# Patient Record
Sex: FEMALE | Race: WHITE | NOT HISPANIC OR LATINO | Employment: OTHER | URBAN - METROPOLITAN AREA
[De-identification: names, ages, dates, MRNs, and addresses within clinical notes are randomized per-mention and may not be internally consistent; named-entity substitution may affect disease eponyms.]

---

## 2020-03-16 RX ORDER — CITALOPRAM 20 MG/1
20 TABLET ORAL DAILY
COMMUNITY
End: 2020-10-21

## 2020-03-16 RX ORDER — POTASSIUM CITRATE 10 MEQ/1
10 TABLET, EXTENDED RELEASE ORAL
COMMUNITY

## 2020-03-16 RX ORDER — LOSARTAN POTASSIUM 100 MG/1
100 TABLET ORAL EVERY MORNING
COMMUNITY

## 2020-03-16 RX ORDER — AMLODIPINE BESYLATE 5 MG/1
5 TABLET ORAL 2 TIMES DAILY
COMMUNITY

## 2020-03-16 RX ORDER — HYDRALAZINE HYDROCHLORIDE 25 MG/1
100 TABLET, FILM COATED ORAL 2 TIMES DAILY
COMMUNITY

## 2020-03-16 RX ORDER — LACOSAMIDE 200 MG/1
200 TABLET ORAL EVERY 12 HOURS SCHEDULED
COMMUNITY

## 2020-03-16 RX ORDER — HYDROCODONE BITARTRATE AND ACETAMINOPHEN 7.5; 325 MG/1; MG/1
1 TABLET ORAL EVERY 6 HOURS PRN
COMMUNITY

## 2020-03-16 RX ORDER — LEVETIRACETAM 500 MG/1
1500 TABLET ORAL 2 TIMES DAILY
COMMUNITY

## 2020-03-16 RX ORDER — ROPINIROLE 0.5 MG/1
0.5 TABLET, FILM COATED ORAL NIGHTLY
COMMUNITY
End: 2020-12-11

## 2020-03-16 RX ORDER — GABAPENTIN 100 MG/1
100 CAPSULE ORAL 2 TIMES DAILY
COMMUNITY

## 2020-03-16 RX ORDER — DIPHENHYDRAMINE HCL 50 MG
50 CAPSULE ORAL EVERY 6 HOURS PRN
COMMUNITY

## 2020-03-16 RX ORDER — CHLORAL HYDRATE 500 MG
1 CAPSULE ORAL DAILY
Status: ON HOLD | COMMUNITY
End: 2020-12-14

## 2020-03-17 ENCOUNTER — OFFICE VISIT (OUTPATIENT)
Dept: NEUROSURGERY | Facility: CLINIC | Age: 65
End: 2020-03-17

## 2020-03-17 ENCOUNTER — PREP FOR SURGERY (OUTPATIENT)
Dept: OTHER | Facility: HOSPITAL | Age: 65
End: 2020-03-17

## 2020-03-17 VITALS — WEIGHT: 160 LBS | HEIGHT: 63 IN | BODY MASS INDEX: 28.35 KG/M2 | TEMPERATURE: 97.8 F

## 2020-03-17 DIAGNOSIS — G40.219 LOCALIZATION-RELATED (FOCAL) (PARTIAL) SYMPTOMATIC EPILEPSY AND EPILEPTIC SYNDROMES WITH COMPLEX PARTIAL SEIZURES, INTRACTABLE, WITHOUT STATUS EPILEPTICUS (HCC): ICD-10-CM

## 2020-03-17 DIAGNOSIS — G40.219 LOCALIZATION-RELATED (FOCAL) (PARTIAL) SYMPTOMATIC EPILEPSY AND EPILEPTIC SYNDROMES WITH COMPLEX PARTIAL SEIZURES, INTRACTABLE, WITHOUT STATUS EPILEPTICUS (HCC): Primary | ICD-10-CM

## 2020-03-17 DIAGNOSIS — Z45.42 BATTERY END OF LIFE OF VAGUS NERVE STIMULATOR: Primary | ICD-10-CM

## 2020-03-17 PROCEDURE — 99204 OFFICE O/P NEW MOD 45 MIN: CPT | Performed by: NEUROLOGICAL SURGERY

## 2020-03-17 RX ORDER — CEFAZOLIN SODIUM 2 G/100ML
2 INJECTION, SOLUTION INTRAVENOUS ONCE
Status: CANCELLED | OUTPATIENT
Start: 2020-03-17 | End: 2020-03-17

## 2020-03-17 RX ORDER — FAMOTIDINE 20 MG/1
20 TABLET, FILM COATED ORAL
Status: CANCELLED | OUTPATIENT
Start: 2020-03-17

## 2020-03-17 NOTE — PROGRESS NOTES
"Patient: Barbi Gomez  : 1955    Primary Care Provider: Gurdeep Yun MD    Requesting Provider: As above        History    Chief Complaint:   1.  Low battery on vagal nerve stimulator.  2.  Seizure disorder.    History of Present Illness: Ms. Gomez is a 64-year-old right-handed disabled woman with a longstanding seizure disorder.  A vagal nerve stimulator was placed at the [facility] in .  She has occasional seizures.  She does sometimes know the seizures are coming on and can swipe her magnet over the unit and abort the seizure.  When she has a seizure she has a feeling as if there is \"a paper sack\" over her head.  She does not lose consciousness.  She is somewhat dazed and confused thereafter.  Seizures are worse with stress.  Apparently the unit is now reading 5%.    Review of Systems   Constitutional: Positive for fatigue. Negative for activity change, appetite change, chills, diaphoresis, fever and unexpected weight change.   HENT: Positive for postnasal drip and sinus pressure. Negative for congestion, dental problem, drooling, ear discharge, ear pain, facial swelling, hearing loss, mouth sores, nosebleeds, rhinorrhea, sneezing, sore throat, tinnitus, trouble swallowing and voice change.    Eyes: Positive for itching. Negative for photophobia, pain, discharge, redness and visual disturbance.   Respiratory: Negative for apnea, cough, choking, chest tightness, shortness of breath, wheezing and stridor.    Cardiovascular: Negative for chest pain, palpitations and leg swelling.   Gastrointestinal: Negative for abdominal distention, abdominal pain, anal bleeding, blood in stool, constipation, diarrhea, nausea, rectal pain and vomiting.   Endocrine: Negative for cold intolerance, heat intolerance, polydipsia, polyphagia and polyuria.   Genitourinary: Negative for decreased urine volume, difficulty urinating, dysuria, enuresis, flank pain, frequency, genital sores, hematuria and urgency. " "  Musculoskeletal: Negative for arthralgias, back pain, gait problem, joint swelling, myalgias, neck pain and neck stiffness.   Skin: Negative for color change, pallor, rash and wound.   Allergic/Immunologic: Negative for environmental allergies, food allergies and immunocompromised state.   Neurological: Positive for dizziness and seizures. Negative for tremors, syncope, facial asymmetry, speech difficulty, weakness, light-headedness, numbness and headaches.   Hematological: Negative for adenopathy. Does not bruise/bleed easily.   Psychiatric/Behavioral: Negative for agitation, behavioral problems, confusion, decreased concentration, dysphoric mood, hallucinations, self-injury, sleep disturbance and suicidal ideas. The patient is not nervous/anxious and is not hyperactive.        The patient's past medical history, past surgical history, family history, and social history have been reviewed at length in the electronic medical record.    Physical Exam:   Temp 97.8 °F (36.6 °C) (Temporal)   Ht 160 cm (63\")   Wt 72.6 kg (160 lb)   BMI 28.34 kg/m²   CONSTITUTIONAL: Patient is well-nourished, pleasant and appears stated age.  CV: Heart regular rate and rhythm without murmur, rub, or gallop.  PULMONARY: Lungs are clear to ascultation.  MUSCULOSKELETAL:  Neck tenderness to palpation is not observed.   ROM in neck is normal.  Well-healed left anterior cervical incision is noted.  The generator is present on the mid chest left of midline.  NEUROLOGICAL:  Orientation, memory, attention span, language function, and cognition have been examined and are intact.  Strength is intact in the upper and lower extremities to direct testing.  Muscle tone is normal throughout.  Station and gait are normal.  Sensation is intact to light touch testing throughout.  Deep tendon reflexes are 1+ and symmetrical.  Kvng's Sign is negative bilaterally. No clonus is elicited.  Coordination is intact.  CRANIAL NERVES:  Cranial Nerve II: " Review of the fundi demonstates no edema.  Visual fields are full to confrontation.  Cranial Nerve III, IV, and VI: PERRLADC. Extraocular movements are intact.  Nystagmus is not present.  Cranial Nerve V: Facial sensation is intact to light touch.  Cranial Nerve VII: Muscles of facial expression demonstate no weakness or asymmetry.  Cranial Nerve VIII: Hearing is intact to finger rub bilaterally.  Cranial Nerve IX and X: Palate elevates symmetrically.  Cranial Nerve XI: Shoulder shrug is intact bilaterally.  Cranial Nerve XII: Tongue is midline without evidence of atrophy or fasciculation.    Medical Decision Making      Diagnosis:   1.  Low battery on vagal nerve stimulator.  2.  Seizure disorder.    Treatment Options:   I have recommended VNS battery change out and revision as necessary.  If her battery runs completely out then she is at high risk for developing further seizures.  The nature of the procedure as well as the potential risks, complications, limitations, and alternatives to the procedure were discussed at length with the patient and the patient has agreed to proceed with surgery.       Diagnosis Plan   1. Battery end of life of vagus nerve stimulator     2. Localization-related (focal) (partial) symptomatic epilepsy and epileptic syndromes with complex partial seizures, intractable, without status epilepticus (CMS/HCC)         Scribed for Chilango Donnelly MD by Corina Donovan CMA on 03/17/2020 at 2:43 PM      I, Dr. Donnelly, personally performed the services described in the documentation, as scribed in my presence, and it is both accurate and complete.

## 2020-03-17 NOTE — H&P
"Patient: Barbi Gomez  : 1955     Primary Care Provider: Gurdeep Yun MD     Requesting Provider: As above           History     Chief Complaint:   1.  Low battery on vagal nerve stimulator.  2.  Seizure disorder.     History of Present Illness: Ms. Gomez is a 64-year-old right-handed disabled woman with a longstanding seizure disorder.  A vagal nerve stimulator was placed at the [facility] in .  She has occasional seizures.  She does sometimes know the seizures are coming on and can swipe her magnet over the unit and abort the seizure.  When she has a seizure she has a feeling as if there is \"a paper sack\" over her head.  She does not lose consciousness.  She is somewhat dazed and confused thereafter.  Seizures are worse with stress.  Apparently the unit is now reading 5%.     Review of Systems   Constitutional: Positive for fatigue. Negative for activity change, appetite change, chills, diaphoresis, fever and unexpected weight change.   HENT: Positive for postnasal drip and sinus pressure. Negative for congestion, dental problem, drooling, ear discharge, ear pain, facial swelling, hearing loss, mouth sores, nosebleeds, rhinorrhea, sneezing, sore throat, tinnitus, trouble swallowing and voice change.    Eyes: Positive for itching. Negative for photophobia, pain, discharge, redness and visual disturbance.   Respiratory: Negative for apnea, cough, choking, chest tightness, shortness of breath, wheezing and stridor.    Cardiovascular: Negative for chest pain, palpitations and leg swelling.   Gastrointestinal: Negative for abdominal distention, abdominal pain, anal bleeding, blood in stool, constipation, diarrhea, nausea, rectal pain and vomiting.   Endocrine: Negative for cold intolerance, heat intolerance, polydipsia, polyphagia and polyuria.   Genitourinary: Negative for decreased urine volume, difficulty urinating, dysuria, enuresis, flank pain, frequency, genital sores, hematuria and urgency. "   Musculoskeletal: Negative for arthralgias, back pain, gait problem, joint swelling, myalgias, neck pain and neck stiffness.   Skin: Negative for color change, pallor, rash and wound.   Allergic/Immunologic: Negative for environmental allergies, food allergies and immunocompromised state.   Neurological: Positive for dizziness and seizures. Negative for tremors, syncope, facial asymmetry, speech difficulty, weakness, light-headedness, numbness and headaches.   Hematological: Negative for adenopathy. Does not bruise/bleed easily.   Psychiatric/Behavioral: Negative for agitation, behavioral problems, confusion, decreased concentration, dysphoric mood, hallucinations, self-injury, sleep disturbance and suicidal ideas. The patient is not nervous/anxious and is not hyperactive.          The patient's past medical history, past surgical history, family history, and social history have been reviewed at length in the electronic medical record.     Past Medical History:   Diagnosis Date   • Chronic kidney disease    • Depression    • Hypertension    • Osteoporosis    • Seizure disorder (CMS/HCC)      Past Surgical History:   Procedure Laterality Date   • ANKLE SURGERY     • CHOLECYSTECTOMY     • CYSTOSCOPY W/ URETEROSCOPY W/ LITHOTRIPSY     • VAGUS NERVE STIMULATOR IMPLANTATION  2015    Dr. Gaurang Ellison     Family History   Problem Relation Age of Onset   • Crohn's disease Mother    • COPD Father    • Cancer Sister      Social History     Socioeconomic History   • Marital status:      Spouse name: Not on file   • Number of children: Not on file   • Years of education: Not on file   • Highest education level: Not on file   Tobacco Use   • Smoking status: Never Smoker   • Smokeless tobacco: Never Used   Substance and Sexual Activity   • Alcohol use: Never     Frequency: Never   • Drug use: Never   • Sexual activity: Defer       Allergies   Allergen Reactions   • Bacitracin Rash   • Benzamide Derivatives Rash   •  "Gramicidin Rash   • Neomycin Rash   • Polymyxin B Rash       Current Outpatient Medications on File Prior to Visit   Medication Sig Dispense Refill   • amLODIPine (NORVASC) 5 MG tablet Take 5 mg by mouth Daily.     • calcium carbonate-cholecalciferol 500-400 MG-UNIT tablet tablet Take  by mouth Daily.     • citalopram (CeleXA) 20 MG tablet Take 20 mg by mouth Daily.     • diphenhydrAMINE (BENADRYL) 50 MG capsule Take 50 mg by mouth Every 6 (Six) Hours As Needed for Itching.     • gabapentin (NEURONTIN) 100 MG capsule Take 100 mg by mouth 3 (Three) Times a Day.     • hydrALAZINE (APRESOLINE) 25 MG tablet Take 25 mg by mouth 3 (Three) Times a Day.     • HYDROcodone-acetaminophen (NORCO) 7.5-325 MG per tablet Take 1 tablet by mouth Every 6 (Six) Hours As Needed for Moderate Pain .     • lacosamide (VIMPAT) 200 MG tablet Take 200 mg by mouth Every 12 (Twelve) Hours.     • levETIRAcetam (KEPPRA) 500 MG tablet Take 500 mg by mouth 2 (Two) Times a Day.     • losartan (COZAAR) 100 MG tablet Take 100 mg by mouth Daily.     • Omega-3 1000 MG capsule Take  by mouth.     • potassium citrate (UROCIT-K) 10 MEQ (1080 MG) CR tablet Take 10 mEq by mouth 3 (Three) Times a Day With Meals.     • rOPINIRole (REQUIP) 0.5 MG tablet Take 0.5 mg by mouth Every Night. Take 1 hour before bedtime.     • Sennosides 8.6 MG capsule Take  by mouth.       No current facility-administered medications on file prior to visit.         Physical Exam:   Temp 97.8 °F (36.6 °C) (Temporal)   Ht 160 cm (63\")   Wt 72.6 kg (160 lb)   BMI 28.34 kg/m²   CONSTITUTIONAL: Patient is well-nourished, pleasant and appears stated age.  CV: Heart regular rate and rhythm without murmur, rub, or gallop.  PULMONARY: Lungs are clear to ascultation.  MUSCULOSKELETAL:  Neck tenderness to palpation is not observed.   ROM in neck is normal.  Well-healed left anterior cervical incision is noted.  The generator is present on the mid chest left of " midline.  NEUROLOGICAL:  Orientation, memory, attention span, language function, and cognition have been examined and are intact.  Strength is intact in the upper and lower extremities to direct testing.  Muscle tone is normal throughout.  Station and gait are normal.  Sensation is intact to light touch testing throughout.  Deep tendon reflexes are 1+ and symmetrical.  Kvng's Sign is negative bilaterally. No clonus is elicited.  Coordination is intact.  CRANIAL NERVES:  Cranial Nerve II: Review of the fundi demonstates no edema.  Visual fields are full to confrontation.  Cranial Nerve III, IV, and VI: PERRLADC. Extraocular movements are intact.  Nystagmus is not present.  Cranial Nerve V: Facial sensation is intact to light touch.  Cranial Nerve VII: Muscles of facial expression demonstate no weakness or asymmetry.  Cranial Nerve VIII: Hearing is intact to finger rub bilaterally.  Cranial Nerve IX and X: Palate elevates symmetrically.  Cranial Nerve XI: Shoulder shrug is intact bilaterally.  Cranial Nerve XII: Tongue is midline without evidence of atrophy or fasciculation.     Medical Decision Making        Diagnosis:   1.  Low battery on vagal nerve stimulator.  2.  Seizure disorder.     Treatment Options:   I have recommended VNS battery change out and revision as necessary.  If her battery runs completely out then she is at high risk for developing further seizures.  The nature of the procedure as well as the potential risks, complications, limitations, and alternatives to the procedure were discussed at length with the patient and the patient has agreed to proceed with surgery.          Diagnosis Plan   1. Battery end of life of vagus nerve stimulator      2. Localization-related (focal) (partial) symptomatic epilepsy and epileptic syndromes with complex partial seizures, intractable, without status epilepticus (CMS/HCC)

## 2020-10-21 ENCOUNTER — OFFICE VISIT (OUTPATIENT)
Dept: NEUROSURGERY | Facility: CLINIC | Age: 65
End: 2020-10-21

## 2020-10-21 ENCOUNTER — PREP FOR SURGERY (OUTPATIENT)
Dept: OTHER | Facility: HOSPITAL | Age: 65
End: 2020-10-21

## 2020-10-21 VITALS
DIASTOLIC BLOOD PRESSURE: 70 MMHG | HEIGHT: 63 IN | BODY MASS INDEX: 28.53 KG/M2 | WEIGHT: 161 LBS | SYSTOLIC BLOOD PRESSURE: 124 MMHG | TEMPERATURE: 97.5 F

## 2020-10-21 DIAGNOSIS — Z45.42 BATTERY END OF LIFE OF VAGUS NERVE STIMULATOR: Primary | ICD-10-CM

## 2020-10-21 DIAGNOSIS — G40.219 LOCALIZATION-RELATED (FOCAL) (PARTIAL) SYMPTOMATIC EPILEPSY AND EPILEPTIC SYNDROMES WITH COMPLEX PARTIAL SEIZURES, INTRACTABLE, WITHOUT STATUS EPILEPTICUS (HCC): ICD-10-CM

## 2020-10-21 PROCEDURE — 99214 OFFICE O/P EST MOD 30 MIN: CPT | Performed by: PHYSICIAN ASSISTANT

## 2020-10-21 RX ORDER — CEFAZOLIN SODIUM 2 G/100ML
2 INJECTION, SOLUTION INTRAVENOUS ONCE
Status: CANCELLED | OUTPATIENT
Start: 2020-10-21 | End: 2020-10-21

## 2020-10-21 RX ORDER — ICOSAPENT ETHYL 1000 MG/1
CAPSULE ORAL 2 TIMES DAILY
COMMUNITY
Start: 2020-09-23 | End: 2020-12-11

## 2020-10-21 RX ORDER — FAMOTIDINE 20 MG/1
20 TABLET, FILM COATED ORAL
Status: CANCELLED | OUTPATIENT
Start: 2020-10-21

## 2020-10-21 NOTE — PROGRESS NOTES
"Patient: Barbi Gomez  : 1955  Chart #: [id number]    Date of Service: 10/21/2020    CHIEF COMPLAINT:   1.  Low battery on vagal nerve stimulator.  2.  Seizure disorder    History of Present Illness Ms. Gomez is a 65-year-old woman who was seen by Dr. Donnelly earlier this year and recommended to have a VNS battery change out.  Her  has been very sick so she has been unable to follow through with surgery.  She is now ready.  She is a right-handed disabled woman with a longstanding seizure disorder.  In , she had a vagal nerve stimulator placed at the [facility] and has done well.  She has occasional seizures.  As of late, she feels like when she swipes her magnet over the unit nothing happens.  Her seizures are characterized as a feeling of a \"paper sack\" over her head.  She does not lose consciousness.  When seen in March of this year, her unit was reading 5%.  She has not been back to see her neurologist, Dr. Car, as she has relocated. She is in the process of establishing care with Dr Dean.       Past Medical History:   Diagnosis Date   • Chronic kidney disease    • Depression    • Hypertension    • Osteoporosis    • Seizure disorder (CMS/HCC)          Current Outpatient Medications:   •  amLODIPine (NORVASC) 5 MG tablet, Take 5 mg by mouth Daily., Disp: , Rfl:   •  calcium carbonate-cholecalciferol 500-400 MG-UNIT tablet tablet, Take  by mouth Daily., Disp: , Rfl:   •  diclofenac (VOLTAREN) 1 % gel gel, Apply  topically to the appropriate area as directed As Needed., Disp: , Rfl:   •  diphenhydrAMINE (BENADRYL) 50 MG capsule, Take 50 mg by mouth Every 6 (Six) Hours As Needed for Itching., Disp: , Rfl:   •  gabapentin (NEURONTIN) 100 MG capsule, Take 100 mg by mouth 3 (Three) Times a Day., Disp: , Rfl:   •  hydrALAZINE (APRESOLINE) 25 MG tablet, Take 25 mg by mouth 3 (Three) Times a Day., Disp: , Rfl:   •  HYDROcodone-acetaminophen (NORCO) 7.5-325 MG per tablet, Take 1 tablet by mouth Every 6 " (Six) Hours As Needed for Moderate Pain ., Disp: , Rfl:   •  lacosamide (VIMPAT) 200 MG tablet, Take 200 mg by mouth Every 12 (Twelve) Hours., Disp: , Rfl:   •  levETIRAcetam (KEPPRA) 500 MG tablet, Take 500 mg by mouth 2 (Two) Times a Day., Disp: , Rfl:   •  losartan (COZAAR) 100 MG tablet, Take 100 mg by mouth Daily., Disp: , Rfl:   •  Omega-3 1000 MG capsule, Take  by mouth., Disp: , Rfl:   •  potassium citrate (UROCIT-K) 10 MEQ (1080 MG) CR tablet, Take 10 mEq by mouth 3 (Three) Times a Day With Meals., Disp: , Rfl:   •  rOPINIRole (REQUIP) 0.5 MG tablet, Take 0.5 mg by mouth Every Night. Take 1 hour before bedtime., Disp: , Rfl:   •  Vascepa 1 g capsule capsule, 2 (two) times a day., Disp: , Rfl:     Past Surgical History:   Procedure Laterality Date   • ANKLE SURGERY     • CHOLECYSTECTOMY     • CYSTOSCOPY W/ URETEROSCOPY W/ LITHOTRIPSY     • VAGUS NERVE STIMULATOR IMPLANTATION  2015    Dr. Gaurang Ellison       Social History     Socioeconomic History   • Marital status:      Spouse name: Not on file   • Number of children: Not on file   • Years of education: Not on file   • Highest education level: Not on file   Tobacco Use   • Smoking status: Never Smoker   • Smokeless tobacco: Never Used   Substance and Sexual Activity   • Alcohol use: Never     Frequency: Never   • Drug use: Never   • Sexual activity: Defer         Review of Systems   Constitutional: Positive for fatigue. Negative for activity change, appetite change, chills, diaphoresis, fever and unexpected weight change.   HENT: Positive for postnasal drip and sinus pressure. Negative for congestion, dental problem, drooling, ear discharge, ear pain, facial swelling, hearing loss, mouth sores, nosebleeds, rhinorrhea, sneezing, sore throat, tinnitus, trouble swallowing and voice change.    Eyes: Positive for itching. Negative for photophobia, pain, discharge, redness and visual disturbance.   Respiratory: Negative for apnea, cough, choking, chest  "tightness, shortness of breath, wheezing and stridor.    Cardiovascular: Negative for chest pain, palpitations and leg swelling.   Gastrointestinal: Negative for abdominal distention, abdominal pain, anal bleeding, blood in stool, constipation, diarrhea, nausea, rectal pain and vomiting.   Endocrine: Negative for cold intolerance, heat intolerance, polydipsia, polyphagia and polyuria.   Genitourinary: Negative for decreased urine volume, difficulty urinating, dysuria, enuresis, flank pain, frequency, genital sores, hematuria and urgency.   Musculoskeletal: Negative for arthralgias, back pain, gait problem, joint swelling, myalgias, neck pain and neck stiffness.   Skin: Negative for color change, pallor, rash and wound.   Allergic/Immunologic: Negative for environmental allergies, food allergies and immunocompromised state.   Neurological: Positive for dizziness and seizures. Negative for tremors, syncope, facial asymmetry, speech difficulty, weakness, light-headedness, numbness and headaches.   Hematological: Negative for adenopathy. Does not bruise/bleed easily.   Psychiatric/Behavioral: Negative for agitation, behavioral problems, confusion, decreased concentration, dysphoric mood, hallucinations, self-injury, sleep disturbance and suicidal ideas. The patient is not nervous/anxious and is not hyperactive.        Objective   Vital Signs: Blood pressure 124/70, temperature 97.5 °F (36.4 °C), height 160 cm (62.99\"), weight 73 kg (161 lb).  Physical Exam  Vitals signs and nursing note reviewed.   Constitutional:       General: She is not in acute distress.     Appearance: She is well-developed.   HENT:      Head: Normocephalic and atraumatic.   Eyes:      Extraocular Movements: Extraocular movements intact.   Cardiovascular:      Heart sounds: Normal heart sounds.   Pulmonary:      Breath sounds: Normal breath sounds.   Psychiatric:         Behavior: Behavior normal.         Thought Content: Thought content normal. "     Musculoskeletal:     Strength is intact in upper and lower extremities to direct testing.     Station and gait are normal.  Neurologic:     Muscle tone is normal throughout.     Coordination is intact.     Deep tendon reflexes: 2+ and symmetrical.     Sensation is intact to light touch throughout.     Patient is oriented to person, place, and time.       Assessment/Plan   Diagnosis:   1.  Low battery on vagal nerve stimulator.  2.  Seizure disorder.     Medical Decision Making: Dr. Donnelly has once again recommended VNS battery change out and revision as necessary.  Its not clear if the device is completely dead.  When last checked, earlier this year, it was functioning at 5% but patient does not think it is working now at all.  She is trying to establish care with Dr. Dean.  We will go ahead an replace the battery.  I have discussed the general nature of the procedure as well as the potential risks, complications, and limitations and patient understands and would like to proceed.        Diagnoses and all orders for this visit:    1. Battery end of life of vagus nerve stimulator (Primary)  -     Ambulatory Referral to Neurology    2. 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

## 2020-10-21 NOTE — H&P
"Patient: Barbi Gomez  : 1955  Chart #: [id number]     Date of Service: 10/21/2020     CHIEF COMPLAINT:   1.  Low battery on vagal nerve stimulator.  2.  Seizure disorder     History of Present Illness Ms. Gomez is a 65-year-old woman who was seen by Dr. Donnelly earlier this year and recommended to have a VNS battery change out.  Her  has been very sick so she has been unable to follow through with surgery.  She is now ready.  She is a right-handed disabled woman with a longstanding seizure disorder.  In , she had a vagal nerve stimulator placed at the [facility] and has done well.  She has occasional seizures.  As of late, she feels like when she swipes her magnet over the unit nothing happens.  Her seizures are characterized as a feeling of a \"paper sack\" over her head.  She does not lose consciousness.  When seen in March of this year, her unit was reading 5%.  She has not been back to see her neurologist, Dr. Car, as she has relocated. She is in the process of establishing care with Dr Dean.         Medical History        Past Medical History:   Diagnosis Date   • Chronic kidney disease     • Depression     • Hypertension     • Osteoporosis     • Seizure disorder (CMS/[facility])                Current Outpatient Medications:   •  amLODIPine (NORVASC) 5 MG tablet, Take 5 mg by mouth Daily., Disp: , Rfl:   •  calcium carbonate-cholecalciferol 500-400 MG-UNIT tablet tablet, Take  by mouth Daily., Disp: , Rfl:   •  diclofenac (VOLTAREN) 1 % gel gel, Apply  topically to the appropriate area as directed As Needed., Disp: , Rfl:   •  diphenhydrAMINE (BENADRYL) 50 MG capsule, Take 50 mg by mouth Every 6 (Six) Hours As Needed for Itching., Disp: , Rfl:   •  gabapentin (NEURONTIN) 100 MG capsule, Take 100 mg by mouth 3 (Three) Times a Day., Disp: , Rfl:   •  hydrALAZINE (APRESOLINE) 25 MG tablet, Take 25 mg by mouth 3 (Three) Times a Day., Disp: , Rfl:   •  HYDROcodone-acetaminophen (NORCO) 7.5-325 MG per " tablet, Take 1 tablet by mouth Every 6 (Six) Hours As Needed for Moderate Pain ., Disp: , Rfl:   •  lacosamide (VIMPAT) 200 MG tablet, Take 200 mg by mouth Every 12 (Twelve) Hours., Disp: , Rfl:   •  levETIRAcetam (KEPPRA) 500 MG tablet, Take 500 mg by mouth 2 (Two) Times a Day., Disp: , Rfl:   •  losartan (COZAAR) 100 MG tablet, Take 100 mg by mouth Daily., Disp: , Rfl:   •  Omega-3 1000 MG capsule, Take  by mouth., Disp: , Rfl:   •  potassium citrate (UROCIT-K) 10 MEQ (1080 MG) CR tablet, Take 10 mEq by mouth 3 (Three) Times a Day With Meals., Disp: , Rfl:   •  rOPINIRole (REQUIP) 0.5 MG tablet, Take 0.5 mg by mouth Every Night. Take 1 hour before bedtime., Disp: , Rfl:   •  Vascepa 1 g capsule capsule, 2 (two) times a day., Disp: , Rfl:      Surgical History         Past Surgical History:   Procedure Laterality Date   • ANKLE SURGERY       • CHOLECYSTECTOMY       • CYSTOSCOPY W/ URETEROSCOPY W/ LITHOTRIPSY       • VAGUS NERVE STIMULATOR IMPLANTATION   2015     Dr. Gaurang Ellison           Social History   Social History            Socioeconomic History   • Marital status:        Spouse name: Not on file   • Number of children: Not on file   • Years of education: Not on file   • Highest education level: Not on file   Tobacco Use   • Smoking status: Never Smoker   • Smokeless tobacco: Never Used   Substance and Sexual Activity   • Alcohol use: Never       Frequency: Never   • Drug use: Never   • Sexual activity: Defer              Review of Systems   Constitutional: Positive for fatigue. Negative for activity change, appetite change, chills, diaphoresis, fever and unexpected weight change.   HENT: Positive for postnasal drip and sinus pressure. Negative for congestion, dental problem, drooling, ear discharge, ear pain, facial swelling, hearing loss, mouth sores, nosebleeds, rhinorrhea, sneezing, sore throat, tinnitus, trouble swallowing and voice change.    Eyes: Positive for itching. Negative for  photophobia, pain, discharge, redness and visual disturbance.   Respiratory: Negative for apnea, cough, choking, chest tightness, shortness of breath, wheezing and stridor.    Cardiovascular: Negative for chest pain, palpitations and leg swelling.   Gastrointestinal: Negative for abdominal distention, abdominal pain, anal bleeding, blood in stool, constipation, diarrhea, nausea, rectal pain and vomiting.   Endocrine: Negative for cold intolerance, heat intolerance, polydipsia, polyphagia and polyuria.   Genitourinary: Negative for decreased urine volume, difficulty urinating, dysuria, enuresis, flank pain, frequency, genital sores, hematuria and urgency.   Musculoskeletal: Negative for arthralgias, back pain, gait problem, joint swelling, myalgias, neck pain and neck stiffness.   Skin: Negative for color change, pallor, rash and wound.   Allergic/Immunologic: Negative for environmental allergies, food allergies and immunocompromised state.   Neurological: Positive for dizziness and seizures. Negative for tremors, syncope, facial asymmetry, speech difficulty, weakness, light-headedness, numbness and headaches.   Hematological: Negative for adenopathy. Does not bruise/bleed easily.   Psychiatric/Behavioral: Negative for agitation, behavioral problems, confusion, decreased concentration, dysphoric mood, hallucinations, self-injury, sleep disturbance and suicidal ideas. The patient is not nervous/anxious and is not hyperactive.             Past Medical History:   Diagnosis Date   • Chronic kidney disease    • Depression    • Hypertension    • Osteoporosis    • Seizure disorder (CMS/HCC)      Past Surgical History:   Procedure Laterality Date   • ANKLE SURGERY     • CHOLECYSTECTOMY     • CYSTOSCOPY W/ URETEROSCOPY W/ LITHOTRIPSY     • VAGUS NERVE STIMULATOR IMPLANTATION  2015    Dr. Gaurang Ellison     Family History   Problem Relation Age of Onset   • Crohn's disease Mother    • COPD Father    • Cancer Sister      Social  History     Socioeconomic History   • Marital status:      Spouse name: Not on file   • Number of children: Not on file   • Years of education: Not on file   • Highest education level: Not on file   Tobacco Use   • Smoking status: Never Smoker   • Smokeless tobacco: Never Used   Substance and Sexual Activity   • Alcohol use: Never     Frequency: Never   • Drug use: Never   • Sexual activity: Defer       Allergies   Allergen Reactions   • Bacitracin Rash   • Benzamide Derivatives Rash   • Gramicidin Rash   • Neomycin Rash   • Polymyxin B Rash       Current Outpatient Medications on File Prior to Visit   Medication Sig Dispense Refill   • amLODIPine (NORVASC) 5 MG tablet Take 5 mg by mouth Daily.     • calcium carbonate-cholecalciferol 500-400 MG-UNIT tablet tablet Take  by mouth Daily.     • diclofenac (VOLTAREN) 1 % gel gel Apply  topically to the appropriate area as directed As Needed.     • diphenhydrAMINE (BENADRYL) 50 MG capsule Take 50 mg by mouth Every 6 (Six) Hours As Needed for Itching.     • gabapentin (NEURONTIN) 100 MG capsule Take 100 mg by mouth 3 (Three) Times a Day.     • hydrALAZINE (APRESOLINE) 25 MG tablet Take 25 mg by mouth 3 (Three) Times a Day.     • HYDROcodone-acetaminophen (NORCO) 7.5-325 MG per tablet Take 1 tablet by mouth Every 6 (Six) Hours As Needed for Moderate Pain .     • lacosamide (VIMPAT) 200 MG tablet Take 200 mg by mouth Every 12 (Twelve) Hours.     • levETIRAcetam (KEPPRA) 500 MG tablet Take 500 mg by mouth 2 (Two) Times a Day.     • losartan (COZAAR) 100 MG tablet Take 100 mg by mouth Daily.     • Omega-3 1000 MG capsule Take  by mouth.     • potassium citrate (UROCIT-K) 10 MEQ (1080 MG) CR tablet Take 10 mEq by mouth 3 (Three) Times a Day With Meals.     • rOPINIRole (REQUIP) 0.5 MG tablet Take 0.5 mg by mouth Every Night. Take 1 hour before bedtime.     • Vascepa 1 g capsule capsule 2 (two) times a day.     • [DISCONTINUED] citalopram (CeleXA) 20 MG tablet Take 20 mg  "by mouth Daily.     • [DISCONTINUED] Sennosides 8.6 MG capsule Take  by mouth.       No current facility-administered medications on file prior to visit.       Objective      Vital Signs: Blood pressure 124/70, temperature 97.5 °F (36.4 °C), height 160 cm (62.99\"), weight 73 kg (161 lb).  Physical Exam  Vitals signs and nursing note reviewed.   Constitutional:       General: She is not in acute distress.     Appearance: She is well-developed.   HENT:      Head: Normocephalic and atraumatic.   Eyes:      Extraocular Movements: Extraocular movements intact.   Cardiovascular:      Heart sounds: Normal heart sounds.   Pulmonary:      Breath sounds: Normal breath sounds.   Psychiatric:         Behavior: Behavior normal.         Thought Content: Thought content normal.      Musculoskeletal:     Strength is intact in upper and lower extremities to direct testing.     Station and gait are normal.  Neurologic:     Muscle tone is normal throughout.     Coordination is intact.     Deep tendon reflexes: 2+ and symmetrical.     Sensation is intact to light touch throughout.     Patient is oriented to person, place, and time.           Assessment/Plan      Diagnosis:   1.  Low battery on vagal nerve stimulator.  2.  Seizure disorder.     Medical Decision Making: Dr. Donnelly has once again recommended VNS battery change out and revision as necessary.  Its not clear if the device is completely dead.  When last checked, earlier this year, it was functioning at 5% but patient does not think it is working now at all.  She is trying to establish care with Dr. Dean.  We will go ahead an replace the battery.  I have discussed the general nature of the procedure as well as the potential risks, complications, and limitations and patient understands and would like to proceed.           Diagnoses and all orders for this visit:     1. Battery end of life of vagus nerve stimulator (Primary)  -     Ambulatory Referral to Neurology     2. " Localization-related (focal) (partial) symptomatic epilepsy and epileptic syndromes with complex partial seizures, intractable, without status epilepticus (CMS/HCC)

## 2020-11-10 ENCOUNTER — APPOINTMENT (OUTPATIENT)
Dept: PREADMISSION TESTING | Facility: HOSPITAL | Age: 65
End: 2020-11-10

## 2020-12-01 ENCOUNTER — APPOINTMENT (OUTPATIENT)
Dept: PREADMISSION TESTING | Facility: HOSPITAL | Age: 65
End: 2020-12-01

## 2020-12-11 ENCOUNTER — APPOINTMENT (OUTPATIENT)
Dept: PREADMISSION TESTING | Facility: HOSPITAL | Age: 65
End: 2020-12-11

## 2020-12-11 VITALS — BODY MASS INDEX: 29.26 KG/M2 | HEIGHT: 63 IN | WEIGHT: 165.12 LBS

## 2020-12-11 DIAGNOSIS — Z45.42 BATTERY END OF LIFE OF VAGUS NERVE STIMULATOR: ICD-10-CM

## 2020-12-11 LAB
DEPRECATED RDW RBC AUTO: 41.8 FL (ref 37–54)
ERYTHROCYTE [DISTWIDTH] IN BLOOD BY AUTOMATED COUNT: 12.2 % (ref 12.3–15.4)
HCT VFR BLD AUTO: 39.9 % (ref 34–46.6)
HGB BLD-MCNC: 12.8 G/DL (ref 12–15.9)
MCH RBC QN AUTO: 29.8 PG (ref 26.6–33)
MCHC RBC AUTO-ENTMCNC: 32.1 G/DL (ref 31.5–35.7)
MCV RBC AUTO: 93 FL (ref 79–97)
PLATELET # BLD AUTO: 226 10*3/MM3 (ref 140–450)
PMV BLD AUTO: 9.6 FL (ref 6–12)
POTASSIUM SERPL-SCNC: 4.8 MMOL/L (ref 3.5–5.2)
QT INTERVAL: 348 MS
QTC INTERVAL: 451 MS
RBC # BLD AUTO: 4.29 10*6/MM3 (ref 3.77–5.28)
WBC # BLD AUTO: 5.33 10*3/MM3 (ref 3.4–10.8)

## 2020-12-11 PROCEDURE — 93005 ELECTROCARDIOGRAM TRACING: CPT

## 2020-12-11 PROCEDURE — U0004 COV-19 TEST NON-CDC HGH THRU: HCPCS

## 2020-12-11 PROCEDURE — C9803 HOPD COVID-19 SPEC COLLECT: HCPCS

## 2020-12-11 PROCEDURE — 87081 CULTURE SCREEN ONLY: CPT

## 2020-12-11 PROCEDURE — 84132 ASSAY OF SERUM POTASSIUM: CPT

## 2020-12-11 PROCEDURE — 36415 COLL VENOUS BLD VENIPUNCTURE: CPT

## 2020-12-11 PROCEDURE — 85027 COMPLETE CBC AUTOMATED: CPT

## 2020-12-11 PROCEDURE — 93010 ELECTROCARDIOGRAM REPORT: CPT | Performed by: INTERNAL MEDICINE

## 2020-12-11 RX ORDER — ROPINIROLE 2 MG/1
2 TABLET, FILM COATED ORAL NIGHTLY
COMMUNITY

## 2020-12-11 RX ORDER — METHOCARBAMOL 500 MG/1
500 TABLET, FILM COATED ORAL 2 TIMES DAILY
COMMUNITY

## 2020-12-11 RX ORDER — AMITRIPTYLINE HYDROCHLORIDE 25 MG/1
25 TABLET, FILM COATED ORAL NIGHTLY
COMMUNITY

## 2020-12-11 RX ORDER — OMEGA-3-ACID ETHYL ESTERS 1 G/1
1 CAPSULE, LIQUID FILLED ORAL 2 TIMES DAILY
COMMUNITY

## 2020-12-11 NOTE — PAT
Patient to apply Chlorhexadine wipes  to surgical area (as instructed) the night before procedure and the AM of procedure. Wipes provided.  Per Anesthesia Request, patient instructed not to take their ACE/ARB medications on the AM of surgery.  Bactroban and Chlorhexidine Prescription given during PAT visit, as well as written and verbal instructions given to patient during PAT visit.  Patient/family also instructed to complete skin prep checklist and return the checklist on the day of surgery to preoperative staff.  Patient/family verbalized understanding.  Patient was instructed to test an area for a reaction with the wipes, Bactroban and wash before applying generously-pt verbalized understanding.

## 2020-12-12 LAB
MRSA SPEC QL CULT: NORMAL
SARS-COV-2 RNA RESP QL NAA+PROBE: NOT DETECTED

## 2020-12-13 ENCOUNTER — ANESTHESIA EVENT (OUTPATIENT)
Dept: PERIOP | Facility: HOSPITAL | Age: 65
End: 2020-12-13

## 2020-12-13 RX ORDER — FAMOTIDINE 10 MG/ML
20 INJECTION, SOLUTION INTRAVENOUS ONCE
Status: CANCELLED | OUTPATIENT
Start: 2020-12-13 | End: 2020-12-13

## 2020-12-13 RX ORDER — SODIUM CHLORIDE 0.9 % (FLUSH) 0.9 %
10 SYRINGE (ML) INJECTION AS NEEDED
Status: CANCELLED | OUTPATIENT
Start: 2020-12-13

## 2020-12-13 RX ORDER — SODIUM CHLORIDE 0.9 % (FLUSH) 0.9 %
10 SYRINGE (ML) INJECTION EVERY 12 HOURS SCHEDULED
Status: CANCELLED | OUTPATIENT
Start: 2020-12-13

## 2020-12-14 ENCOUNTER — HOSPITAL ENCOUNTER (OUTPATIENT)
Facility: HOSPITAL | Age: 65
Discharge: HOME | End: 2020-12-14
Attending: NEUROLOGICAL SURGERY | Admitting: NEUROLOGICAL SURGERY

## 2020-12-14 ENCOUNTER — ANESTHESIA (OUTPATIENT)
Dept: PERIOP | Facility: HOSPITAL | Age: 65
End: 2020-12-14

## 2020-12-14 VITALS
DIASTOLIC BLOOD PRESSURE: 68 MMHG | SYSTOLIC BLOOD PRESSURE: 132 MMHG | HEIGHT: 63 IN | TEMPERATURE: 98 F | BODY MASS INDEX: 29.23 KG/M2 | HEART RATE: 76 BPM | WEIGHT: 165 LBS | OXYGEN SATURATION: 96 % | RESPIRATION RATE: 14 BRPM

## 2020-12-14 DIAGNOSIS — Z45.42 BATTERY END OF LIFE OF VAGUS NERVE STIMULATOR: ICD-10-CM

## 2020-12-14 PROCEDURE — 25010000002 DEXAMETHASONE PER 1 MG: Performed by: NURSE ANESTHETIST, CERTIFIED REGISTERED

## 2020-12-14 PROCEDURE — 25010000002 ONDANSETRON PER 1 MG: Performed by: NURSE ANESTHETIST, CERTIFIED REGISTERED

## 2020-12-14 PROCEDURE — 25010000002 NEOSTIGMINE 10 MG/10ML SOLUTION: Performed by: NURSE ANESTHETIST, CERTIFIED REGISTERED

## 2020-12-14 PROCEDURE — 25010000003 CEFAZOLIN IN DEXTROSE 2-4 GM/100ML-% SOLUTION: Performed by: NEUROLOGICAL SURGERY

## 2020-12-14 PROCEDURE — 25010000002 FENTANYL CITRATE (PF) 100 MCG/2ML SOLUTION: Performed by: NURSE ANESTHETIST, CERTIFIED REGISTERED

## 2020-12-14 PROCEDURE — 25010000002 PROPOFOL 10 MG/ML EMULSION: Performed by: NURSE ANESTHETIST, CERTIFIED REGISTERED

## 2020-12-14 PROCEDURE — 61885 INSRT/REDO NEUROSTIM 1 ARRAY: CPT | Performed by: NEUROLOGICAL SURGERY

## 2020-12-14 PROCEDURE — 95977 ALYS CPLX CN NPGT PRGRMG: CPT | Performed by: NEUROLOGICAL SURGERY

## 2020-12-14 PROCEDURE — C1767 GENERATOR, NEURO NON-RECHARG: HCPCS | Performed by: NEUROLOGICAL SURGERY

## 2020-12-14 DEVICE — IMPLANTABLE DEVICE
Type: IMPLANTABLE DEVICE | Site: CHEST | Status: FUNCTIONAL
Brand: SURGIFOAM® ABSORBABLE GELATIN SPONGE, U.S.P.

## 2020-12-14 DEVICE — IMPLANTABLE PULSE GENERATOR
Type: IMPLANTABLE DEVICE | Site: CHEST | Status: FUNCTIONAL
Brand: VNS THERAPY® ASPIRESR® MODEL 106 GENERATOR

## 2020-12-14 RX ORDER — PROPOFOL 10 MG/ML
VIAL (ML) INTRAVENOUS AS NEEDED
Status: DISCONTINUED | OUTPATIENT
Start: 2020-12-14 | End: 2020-12-14 | Stop reason: SURG

## 2020-12-14 RX ORDER — LIDOCAINE HYDROCHLORIDE 10 MG/ML
0.5 INJECTION, SOLUTION EPIDURAL; INFILTRATION; INTRACAUDAL; PERINEURAL ONCE AS NEEDED
Status: COMPLETED | OUTPATIENT
Start: 2020-12-14 | End: 2020-12-14

## 2020-12-14 RX ORDER — NEOSTIGMINE METHYLSULFATE 1 MG/ML
INJECTION, SOLUTION INTRAVENOUS AS NEEDED
Status: DISCONTINUED | OUTPATIENT
Start: 2020-12-14 | End: 2020-12-14 | Stop reason: SURG

## 2020-12-14 RX ORDER — MAGNESIUM HYDROXIDE 1200 MG/15ML
LIQUID ORAL AS NEEDED
Status: DISCONTINUED | OUTPATIENT
Start: 2020-12-14 | End: 2020-12-14 | Stop reason: HOSPADM

## 2020-12-14 RX ORDER — FAMOTIDINE 20 MG/1
20 TABLET, FILM COATED ORAL ONCE
Status: COMPLETED | OUTPATIENT
Start: 2020-12-14 | End: 2020-12-14

## 2020-12-14 RX ORDER — FENTANYL CITRATE 50 UG/ML
50 INJECTION, SOLUTION INTRAMUSCULAR; INTRAVENOUS
Status: DISCONTINUED | OUTPATIENT
Start: 2020-12-14 | End: 2020-12-14 | Stop reason: HOSPADM

## 2020-12-14 RX ORDER — ONDANSETRON 2 MG/ML
4 INJECTION INTRAMUSCULAR; INTRAVENOUS ONCE AS NEEDED
Status: DISCONTINUED | OUTPATIENT
Start: 2020-12-14 | End: 2020-12-14 | Stop reason: HOSPADM

## 2020-12-14 RX ORDER — HYDROMORPHONE HYDROCHLORIDE 1 MG/ML
0.5 INJECTION, SOLUTION INTRAMUSCULAR; INTRAVENOUS; SUBCUTANEOUS
Status: DISCONTINUED | OUTPATIENT
Start: 2020-12-14 | End: 2020-12-14 | Stop reason: HOSPADM

## 2020-12-14 RX ORDER — ONDANSETRON 2 MG/ML
INJECTION INTRAMUSCULAR; INTRAVENOUS AS NEEDED
Status: DISCONTINUED | OUTPATIENT
Start: 2020-12-14 | End: 2020-12-14 | Stop reason: SURG

## 2020-12-14 RX ORDER — GLYCOPYRROLATE 0.2 MG/ML
INJECTION INTRAMUSCULAR; INTRAVENOUS AS NEEDED
Status: DISCONTINUED | OUTPATIENT
Start: 2020-12-14 | End: 2020-12-14 | Stop reason: SURG

## 2020-12-14 RX ORDER — ROCURONIUM BROMIDE 10 MG/ML
INJECTION, SOLUTION INTRAVENOUS AS NEEDED
Status: DISCONTINUED | OUTPATIENT
Start: 2020-12-14 | End: 2020-12-14 | Stop reason: SURG

## 2020-12-14 RX ORDER — SODIUM CHLORIDE, SODIUM LACTATE, POTASSIUM CHLORIDE, CALCIUM CHLORIDE 600; 310; 30; 20 MG/100ML; MG/100ML; MG/100ML; MG/100ML
9 INJECTION, SOLUTION INTRAVENOUS CONTINUOUS
Status: DISCONTINUED | OUTPATIENT
Start: 2020-12-14 | End: 2020-12-14 | Stop reason: HOSPADM

## 2020-12-14 RX ORDER — LIDOCAINE HYDROCHLORIDE 20 MG/ML
INJECTION, SOLUTION INFILTRATION; PERINEURAL AS NEEDED
Status: DISCONTINUED | OUTPATIENT
Start: 2020-12-14 | End: 2020-12-14 | Stop reason: SURG

## 2020-12-14 RX ORDER — DEXAMETHASONE SODIUM PHOSPHATE 10 MG/ML
INJECTION INTRAMUSCULAR; INTRAVENOUS AS NEEDED
Status: DISCONTINUED | OUTPATIENT
Start: 2020-12-14 | End: 2020-12-14 | Stop reason: SURG

## 2020-12-14 RX ORDER — CEFAZOLIN SODIUM 2 G/100ML
2 INJECTION, SOLUTION INTRAVENOUS ONCE
Status: COMPLETED | OUTPATIENT
Start: 2020-12-14 | End: 2020-12-14

## 2020-12-14 RX ORDER — DEXAMETHASONE SODIUM PHOSPHATE 4 MG/ML
8 INJECTION, SOLUTION INTRA-ARTICULAR; INTRALESIONAL; INTRAMUSCULAR; INTRAVENOUS; SOFT TISSUE ONCE AS NEEDED
Status: DISCONTINUED | OUTPATIENT
Start: 2020-12-14 | End: 2020-12-14 | Stop reason: HOSPADM

## 2020-12-14 RX ORDER — FENTANYL CITRATE 50 UG/ML
INJECTION, SOLUTION INTRAMUSCULAR; INTRAVENOUS AS NEEDED
Status: DISCONTINUED | OUTPATIENT
Start: 2020-12-14 | End: 2020-12-14 | Stop reason: SURG

## 2020-12-14 RX ADMIN — CEFAZOLIN SODIUM 2 G: 2 INJECTION, SOLUTION INTRAVENOUS at 10:35

## 2020-12-14 RX ADMIN — ROCURONIUM BROMIDE 25 MG: 10 INJECTION INTRAVENOUS at 10:40

## 2020-12-14 RX ADMIN — FAMOTIDINE 20 MG: 20 TABLET, FILM COATED ORAL at 09:33

## 2020-12-14 RX ADMIN — DEXAMETHASONE SODIUM PHOSPHATE 4 MG: 10 INJECTION INTRAMUSCULAR; INTRAVENOUS at 10:54

## 2020-12-14 RX ADMIN — ONDANSETRON 4 MG: 2 INJECTION INTRAMUSCULAR; INTRAVENOUS at 11:02

## 2020-12-14 RX ADMIN — LIDOCAINE HYDROCHLORIDE 40 MG: 20 INJECTION, SOLUTION INFILTRATION; PERINEURAL at 10:40

## 2020-12-14 RX ADMIN — PROPOFOL 150 MG: 10 INJECTION, EMULSION INTRAVENOUS at 10:40

## 2020-12-14 RX ADMIN — NEOSTIGMINE 2.5 MG: 1 INJECTION INTRAVENOUS at 11:05

## 2020-12-14 RX ADMIN — LIDOCAINE HYDROCHLORIDE 0.5 ML: 10 INJECTION, SOLUTION EPIDURAL; INFILTRATION; INTRACAUDAL; PERINEURAL at 09:13

## 2020-12-14 RX ADMIN — FENTANYL CITRATE 50 MCG: 50 INJECTION, SOLUTION INTRAMUSCULAR; INTRAVENOUS at 10:57

## 2020-12-14 RX ADMIN — SODIUM CHLORIDE, POTASSIUM CHLORIDE, SODIUM LACTATE AND CALCIUM CHLORIDE 9 ML/HR: 600; 310; 30; 20 INJECTION, SOLUTION INTRAVENOUS at 09:13

## 2020-12-14 RX ADMIN — GLYCOPYRROLATE 0.4 MG: 0.2 INJECTION INTRAMUSCULAR; INTRAVENOUS at 11:05

## 2020-12-14 RX ADMIN — FENTANYL CITRATE 50 MCG: 50 INJECTION, SOLUTION INTRAMUSCULAR; INTRAVENOUS at 10:53

## 2020-12-14 NOTE — OP NOTE
NEUROSURGICAL OPERATIVE NOTE        PREOPERATIVE DIAGNOSIS:    Low battery on vagal nerve stimulator  Seizure disorder      POSTOPERATIVE DIAGNOSIS:  Same      PROCEDURE:  Vagal nerve stimulator battery change out      SURGEON:  Chilango Donnelly M.D.      ASSISTANT: Shelli Stoner PA-C    PAC assisted with:   Suctioning   Retraction   Tying   Suturing   Closing   Application of dressing   Skilled neurosurgery PA assistance was necessary to perform this procedure.        ANESTHESIA:  General      ESTIMATED BLOOD LOSS: Minimal      SPECIMEN: None      DRAINS: None      COMPLICATIONS:  None      CLINICAL NOTE:  The patient is a 65-year-old woman with a longstanding seizure disorder.  A number of years ago she had a vagal nerve stimulator placed and that has been reading low battery.  As such, she presents at this time for VNS battery change out and revision as necessary.  The nature of the procedure as well as the potential risks, complications, limitations, and alternatives to the procedure were discussed at length with the patient and the patient has agreed to proceed with surgery.      TECHNICAL NOTE:  The patient was brought to the operating room and placed on the operating room table in the supine position. General endotracheal anesthesia was achieved. Her VNS unit was interrogated and settings were recorded. Her left chest wall and neck were then prepared and draped in the usual fashion. The previous incision on her mid to left-sided chest wall was reopened. Underlying tissues were divided with cautery to provide exposure to the old generator. This was removed from the pocket and disconnected from the lead. Special care was ensured to protect the lead. This was an old 105 generator that was disconnected from the lead. The new 106 unit was brought into the field and affixed to the stimulator lead. Impedances were appropriate. The new 106 generator was placed back in the pocket and secured in place with a 3-0  silk suture. The system was interrogated once again and impedances were appropriate. The heart rate tracking sensitivity was set to 1. Settings included output of 2 milliamps, frequency of 30 Hertz, pulse width of 250, on-time 14 seconds, off-time 0.8 minutes, magnet output 2.25 milliamps, pulse 250, on-time 60 seconds. The new generator serial number was [id number]. Subcutaneous tissues were closed in layers with 3-0 Vicryl suture. The skin was closed in a running subcuticular fashion with 3-0 Vicryl suture. A dermal sealant and sterile dressing were applied. She did receive preoperative antibiotics. She was subsequently taken to the recovery room in satisfactory condition. There were no overt intraoperative complications.             Chilango Donnelly M.D.

## 2020-12-14 NOTE — ANESTHESIA POSTPROCEDURE EVALUATION
Patient: Barbi Guerra Pond    Procedure Summary     Date: 12/14/20 Room / Location:  [facility] /  [facility]    Anesthesia Start: 1035 Anesthesia Stop: 1116    Procedure: VNS battery change out (Left Chest) Diagnosis:       Battery end of life of vagus nerve stimulator      (Battery end of life of vagus nerve stimulator [Z45.42])    Surgeon: Chilango Donnelly MD Provider: Yariel Juares MD    Anesthesia Type: general ASA Status: 2          Anesthesia Type: general    Vitals  No vitals data found for the desired time range.          Post Anesthesia Care and Evaluation    Patient location during evaluation: PACU  Patient participation: complete - patient participated  Level of consciousness: awake and alert  Pain management: adequate  Airway patency: patent  Anesthetic complications: No anesthetic complications  PONV Status: none  Cardiovascular status: hemodynamically stable and acceptable  Respiratory status: nonlabored ventilation, acceptable and nasal cannula  Hydration status: acceptable

## 2020-12-14 NOTE — H&P
Pre-Op H&P  Barbi Gomez  [id number]  1955      Chief complaint: seizure disorder      Subjective:  Patient is a 65 y.o.female presents for scheduled surgery by Dr. Donnelly. She anticipates a  VNS battery change out and revision is necessary today. She was seen by her neurologist in May and was told the battery had 5% battery life at that time. Her last seizure was about 5 days ago.      Review of Systems:  Constitutional-- No fever, chills or sweats. No fatigue.  CV-- No chest pain, palpitation or syncope. +HTN  Resp-- No SOB, cough, hemoptysis  Skin--No rashes or lesions      Allergies:   Allergies   Allergen Reactions   • Benzamide Derivatives Rash   • Neosporin [Bacitracin-Polymyxin B] Rash         Home Meds:  Medications Prior to Admission   Medication Sig Dispense Refill Last Dose   • amLODIPine (NORVASC) 5 MG tablet Take 5 mg by mouth 2 (two) times a day.   12/14/2020 at 0500   • calcium carbonate-cholecalciferol 500-400 MG-UNIT tablet tablet Take 1 tablet by mouth Daily.   12/14/2020 at 0500   • Chlorhexidine Gluconate (Antiseptic Skin Cleanser) 4 % solution Shower each day with solution x5 days, beginning 5 days before surgery. 237 mL 0 12/13/2020 at 2230   • gabapentin (NEURONTIN) 100 MG capsule Take 100 mg by mouth 2 (two) times a day.   12/14/2020 at 0500   • hydrALAZINE (APRESOLINE) 25 MG tablet Take 100 mg by mouth 2 (two) times a day.   12/14/2020 at 0500   • levETIRAcetam (KEPPRA) 500 MG tablet Take 1,500 mg by mouth 2 (Two) Times a Day.   12/14/2020 at 0500   • losartan (COZAAR) 100 MG tablet Take 100 mg by mouth Every Morning.   12/13/2020 at 0830   • methocarbamol (ROBAXIN) 500 MG tablet Take 500 mg by mouth 2 (two) times a day.   12/13/2020 at 2230   • mupirocin (BACTROBAN) 2 % ointment Apply to the inside of each nostril with cotton swab twice daily, morning and night, X5 days before surgery 22 g 0 12/14/2020 at 0500   • omega-3 acid ethyl esters (Lovaza) 1 g capsule Take 1 g by mouth  "2 (Two) Times a Day.   12/14/2020 at 0500   • potassium citrate (UROCIT-K) 10 MEQ (1080 MG) CR tablet Take 10 mEq by mouth 3 (Three) Times a Day With Meals.   12/13/2020 at 2230   • rOPINIRole (REQUIP) 2 MG tablet Take 2 mg by mouth Every Night.   12/13/2020 at 2230   • amitriptyline (ELAVIL) 25 MG tablet Take 25 mg by mouth Every Night.      • diclofenac (VOLTAREN) 1 % gel gel Apply  topically to the appropriate area as directed As Needed.   12/11/2020   • diphenhydrAMINE (BENADRYL) 50 MG capsule Take 50 mg by mouth Every 6 (Six) Hours As Needed for Itching.   12/12/2020   • HYDROcodone-acetaminophen (NORCO) 7.5-325 MG per tablet Take 1 tablet by mouth Every 6 (Six) Hours As Needed for Moderate Pain .   12/10/2020   • lacosamide (VIMPAT) 200 MG tablet Take 200 mg by mouth Every 12 (Twelve) Hours.   More than a month at Unknown time         PMH:   Past Medical History:   Diagnosis Date   • Acid reflux    • Chronic kidney disease    • Depression    • Hypertension    • Osteoporosis    • Seizure disorder (CMS/HCC)    • Wears glasses      PSH:    Past Surgical History:   Procedure Laterality Date   • ANKLE SURGERY Left     Plate and six pins   • CHOLECYSTECTOMY     • COLONOSCOPY  2013   • CYSTOSCOPY W/ URETEROSCOPY W/ LITHOTRIPSY     • SHOULDER SURGERY Right     Pt states for frozen shoulder   • VAGUS NERVE STIMULATOR IMPLANTATION  2015    Dr. Gaurang Ellison       Immunization History:  Influenza: 2020  Pneumococcal: Yes  Tetanus: Yes      Social History:   Tobacco:   Social History     Tobacco Use   Smoking Status Never Smoker   Smokeless Tobacco Never Used      Alcohol:     Social History     Substance and Sexual Activity   Alcohol Use Never   • Frequency: Never         Physical Exam:/80 (BP Location: Right arm, Patient Position: Lying)   Pulse 92   Temp 98.3 °F (36.8 °C) (Temporal)   Resp 16   Ht 160 cm (63\")   Wt 74.8 kg (165 lb)   SpO2 96%   BMI 29.23 kg/m²       General Appearance:    Alert, " cooperative, no distress, appears stated age   Head:    Normocephalic, without obvious abnormality, atraumatic   Lungs:     Clear to auscultation bilaterally, respirations unlabored    Heart:   Regular rate and rhythm, S1 and S2 normal, no murmur, rub    or gallop    Abdomen:    Soft without tenderness   Breast Exam:    deferred   Genitalia:    deferred   Extremities:   Extremities normal, atraumatic, no cyanosis or edema   Skin:   Skin color, texture, turgor normal, no rashes or lesions   Neurologic:   Grossly intact     Results Review:     LABS:  Lab Results   Component Value Date    WBC 5.33 12/11/2020    HGB 12.8 12/11/2020    HCT 39.9 12/11/2020    MCV 93.0 12/11/2020     12/11/2020    K 4.8 12/11/2020       RADIOLOGY:  Imaging Results (Last 72 Hours)     ** No results found for the last 72 hours. **          I reviewed the patient's new clinical results.    Cancer Staging (if applicable)  Cancer Patient: __ yes __no __unknown; If yes, clinical stage T:__ N:__M:__, stage group or __N/A      Impression: Battery end of life of vagus nerve stimulator      Plan: VNS battery change out and revision is necessary      Freda Garcia, YOLANDE   12/14/2020   10:00 EST

## 2020-12-14 NOTE — ANESTHESIA PROCEDURE NOTES
Airway  Urgency: elective    Date/Time: 12/14/2020 10:44 AM  Difficult airway    General Information and Staff    Patient location during procedure: OR    Indications and Patient Condition  Indications for airway management: airway protection    Preoxygenated: yes  Mask difficulty assessment: 1 - vent by mask    Final Airway Details  Final airway type: endotracheal airway      Successful airway: ETT  Cuffed: yes   Successful intubation technique: direct laryngoscopy  Facilitating devices/methods: intubating stylet  Endotracheal tube insertion site: oral  Blade: Klein  Blade size: 2  ETT size (mm): 7.0  Cormack-Lehane Classification: grade IIa - partial view of glottis  Placement verified by: chest auscultation   Measured from: lips  ETT/EBT  to lips (cm): 21  Number of attempts at approach: 2  Assessment: lips, teeth, and gum same as pre-op and atraumatic intubation

## 2020-12-14 NOTE — ANESTHESIA PREPROCEDURE EVALUATION
Anesthesia Evaluation                  Airway   Mallampati: I  TM distance: >3 FB  Neck ROM: full  Dental      Pulmonary    Cardiovascular     ECG reviewed    (+) hypertension,       Neuro/Psych  (+) seizures, psychiatric history,     GI/Hepatic/Renal/Endo    (+)  GERD,  renal disease,     Musculoskeletal     Abdominal    Substance History      OB/GYN          Other                        Anesthesia Plan    ASA 2     general     intravenous induction     Anesthetic plan, all risks, benefits, and alternatives have been provided, discussed and informed consent has been obtained with: patient.    Plan discussed with CRNA.

## 2021-01-06 ENCOUNTER — OFFICE VISIT (OUTPATIENT)
Dept: NEUROSURGERY | Facility: CLINIC | Age: 66
End: 2021-01-06

## 2021-01-06 VITALS
BODY MASS INDEX: 28.88 KG/M2 | SYSTOLIC BLOOD PRESSURE: 130 MMHG | TEMPERATURE: 98.6 F | DIASTOLIC BLOOD PRESSURE: 70 MMHG | WEIGHT: 163 LBS | HEIGHT: 63 IN

## 2021-01-06 DIAGNOSIS — Z45.42 BATTERY END OF LIFE OF VAGUS NERVE STIMULATOR: Primary | ICD-10-CM

## 2021-01-06 PROCEDURE — 99024 POSTOP FOLLOW-UP VISIT: CPT | Performed by: PHYSICIAN ASSISTANT

## 2021-01-06 NOTE — PROGRESS NOTES
Patient: Barbi Gomez  : 1955  Gender: female    Primary Care Provider: Gurdeep Yun MD    Chief Complaint: Low battery on vagal nerve stimulator; seizure disorder    History of Present Illness:  Ms. Gomez is a 65-year-old woman with a longstanding history of seizure disorder.  She had a vagal nerve stimulator placed in the remote past.  Over the last year she felt that her unit was not working correctly.  Her battery read 5% in 2020.  As such Dr. Donnelly recommended VNS battery change out and revision.  She underwent this procedure on 2020.    Patient presents today approximately 3 weeks postop for wound check.  She very happy with the battery change out.  She feels that her unit is working correctly at this time.  Her left chest wall incision has healed well without issue.  Overall she is doing well with no complaints.      Past Medical and Surgical History:  Past Medical History:   Diagnosis Date   • Acid reflux    • Chronic kidney disease    • Depression    • Hypertension    • Osteoporosis    • Seizure disorder (CMS/HCC)    • Wears glasses      Past Surgical History:   Procedure Laterality Date   • ANKLE SURGERY Left     Plate and six pins   • CHOLECYSTECTOMY     • COLONOSCOPY     • CYSTOSCOPY W/ URETEROSCOPY W/ LITHOTRIPSY     • SHOULDER SURGERY Right     Pt states for frozen shoulder   • VAGUS NERVE STIMULATOR IMPLANTATION      Dr. Gaurang Ellison   • VAGUS NERVE STIMULATOR IMPLANTATION Left 2020    Procedure: VNS battery change out;  Surgeon: Chilango Donnelly MD;  Location: [facility];  Service: Neurosurgery;  Laterality: Left;       Current Medications:    Current Outpatient Medications:   •  amitriptyline (ELAVIL) 25 MG tablet, Take 25 mg by mouth Every Night., Disp: , Rfl:   •  amLODIPine (NORVASC) 5 MG tablet, Take 5 mg by mouth 2 (two) times a day., Disp: , Rfl:   •  calcium carbonate-cholecalciferol 500-400 MG-UNIT tablet tablet, Take 1 tablet by mouth Daily., Disp:  ", Rfl:   •  diclofenac (VOLTAREN) 1 % gel gel, Apply  topically to the appropriate area as directed As Needed., Disp: , Rfl:   •  diphenhydrAMINE (BENADRYL) 50 MG capsule, Take 50 mg by mouth Every 6 (Six) Hours As Needed for Itching., Disp: , Rfl:   •  gabapentin (NEURONTIN) 100 MG capsule, Take 100 mg by mouth 2 (two) times a day., Disp: , Rfl:   •  hydrALAZINE (APRESOLINE) 25 MG tablet, Take 100 mg by mouth 2 (two) times a day., Disp: , Rfl:   •  HYDROcodone-acetaminophen (NORCO) 7.5-325 MG per tablet, Take 1 tablet by mouth Every 6 (Six) Hours As Needed for Moderate Pain ., Disp: , Rfl:   •  lacosamide (VIMPAT) 200 MG tablet, Take 200 mg by mouth Every 12 (Twelve) Hours., Disp: , Rfl:   •  levETIRAcetam (KEPPRA) 500 MG tablet, Take 1,500 mg by mouth 2 (Two) Times a Day., Disp: , Rfl:   •  losartan (COZAAR) 100 MG tablet, Take 100 mg by mouth Every Morning., Disp: , Rfl:   •  methocarbamol (ROBAXIN) 500 MG tablet, Take 500 mg by mouth 2 (two) times a day., Disp: , Rfl:   •  omega-3 acid ethyl esters (Lovaza) 1 g capsule, Take 1 g by mouth 2 (Two) Times a Day., Disp: , Rfl:   •  potassium citrate (UROCIT-K) 10 MEQ (1080 MG) CR tablet, Take 10 mEq by mouth 3 (Three) Times a Day With Meals., Disp: , Rfl:   •  rOPINIRole (REQUIP) 2 MG tablet, Take 2 mg by mouth Every Night., Disp: , Rfl:     Allergies:  Allergies   Allergen Reactions   • Benzamide Derivatives Rash   • Neosporin [Bacitracin-Polymyxin B] Rash         Review of Systems   All other systems reviewed and are negative.        Physical Exam  Patient is alert, oriented, no acute distress  She moves about comfortably  Left chest wall incision is well-healed without erythema, fluctuance or drainage.    Vitals:    01/06/21 1458   BP: 130/70   BP Location: Right arm   Patient Position: Sitting   Cuff Size: Adult   Temp: 98.6 °F (37 °C)   TempSrc: Infrared   Weight: 73.9 kg (163 lb)   Height: 160 cm (63\")       Patient's Body mass index is 28.87 kg/m². BMI is " above normal parameters. Recommendations include: educational material.      Assessment:  1.  Seizure disorder  2.  Status post vagal nerve stimulator battery change out    Plan:  Ms. Gomez is thrilled with her new VNS unit.  She states that she has had no issues since the surgery.  Her incision has healed well without complication.  She will contact us with any additional concerns.  Otherwise she will return to our clinic as needed.          Barb Arriaga PA-C

## (undated) DEVICE — PROVIDES A STERILE INTERFACE BETWEEN THE OPERATING ROOM SURGICAL LAMPS (NON-STERILE) AND THE SURGEON OR NURSE (STERILE).: Brand: STERION®CLAMP COVER FABRIC

## (undated) DEVICE — APPL CHLORAPREP TINTED 26ML TEAL

## (undated) DEVICE — GLV SURG PREMIERPRO MIC LTX PF SZ7.5 BRN

## (undated) DEVICE — GLV SURG PREMIERPRO MIC LTX PF SZ7 BRN

## (undated) DEVICE — PK NEURO DISC 10

## (undated) DEVICE — CAMERA/LASER ARM DRAPE: Brand: DEROYAL

## (undated) DEVICE — ELECTRD BLD EZ CLN MOD XLNG 2.75IN

## (undated) DEVICE — GLV SURG PREMIERPRO MIC LTX PF SZ6.5 BRN

## (undated) DEVICE — SUT SILK 2/0 SH CR8 18IN CR8 C012D

## (undated) DEVICE — SUT VIC PLS CTD ANTIB BR 3/0 8/18IN 45CM

## (undated) DEVICE — SUT SILK 2/0 TIES 18IN A185H

## (undated) DEVICE — STRAP POSTN KN/BDY FM 5X72IN DISP

## (undated) DEVICE — Device

## (undated) DEVICE — SKIN AFFIX SURG ADHESIVE 72/CS 0.55ML: Brand: MEDLINE

## (undated) DEVICE — INTENDED TO BE USED TO OCCLUDE, RETRACT AND IDENTIFY ARTERIES, VEINS, TENDONS AND NERVES IN SURGICAL PROCEDURES: Brand: STERION®  VESSEL LOOP

## (undated) DEVICE — NDL HYPO SFTY PROEDGE 27G 1 1/4IN GRY